# Patient Record
Sex: MALE | Race: OTHER | Employment: OTHER | URBAN - METROPOLITAN AREA
[De-identification: names, ages, dates, MRNs, and addresses within clinical notes are randomized per-mention and may not be internally consistent; named-entity substitution may affect disease eponyms.]

---

## 2017-02-23 PROBLEM — R06.02 SHORTNESS OF BREATH: Status: ACTIVE | Noted: 2017-01-12

## 2017-04-18 ENCOUNTER — PATIENT OUTREACH (OUTPATIENT)
Dept: CASE MANAGEMENT | Age: 77
End: 2017-04-18

## 2017-04-18 NOTE — PROGRESS NOTES
[facility] initial call outreach to Mr. Janessa Brady, no answer, left a voice message at [phone] with my contact number. I will make another attempt next week to engage patient in our services.

## 2017-04-26 ENCOUNTER — PATIENT OUTREACH (OUTPATIENT)
Dept: CASE MANAGEMENT | Age: 77
End: 2017-04-26

## 2017-04-26 NOTE — Clinical Note
Seems this one may have been a recent d/c from [facility]. .I have been unable to reach this patient by phone so far, but looks like the PCP visit was cancelled.

## 2017-04-26 NOTE — PROGRESS NOTES
CCM/MSSP outreach to Mr. Chan Level, home contact there was no answer, left a message and tried the emergency contact as well, but was not able to leave a message there. I will try again next week. It appears the patient may have recently been discharged from [facility] according to the last chart note.

## 2017-05-04 ENCOUNTER — PATIENT OUTREACH (OUTPATIENT)
Dept: CASE MANAGEMENT | Age: 77
End: 2017-05-04

## 2017-09-13 PROBLEM — K59.01 SLOW TRANSIT CONSTIPATION: Status: ACTIVE | Noted: 2017-04-12

## 2017-09-13 PROBLEM — F41.8 DEPRESSION WITH ANXIETY: Status: ACTIVE | Noted: 2017-09-13

## 2017-09-19 ENCOUNTER — PATIENT OUTREACH (OUTPATIENT)
Dept: CASE MANAGEMENT | Age: 77
End: 2017-09-19

## 2017-09-19 NOTE — PROGRESS NOTES
CCM referral from PCP to contact patient and see where we can assist. I did call Mr. Denzel Barriga and he was very difficult to understand over the phone, but I was able to verify his list of medications. He does speak [place] but he speaks very fast and mumbled. I asked him to provide me with the number and name of his [facility]  or nurse and he gave me was [phone] or [phone] and neither of these numbers were valid. I will attempt to reach out to a family member to help me. I did review the chart and medications, seems patient does have nursing coming in the home and he has multiple chronic conditions that is managed. I will evaluate and see if I can schedule an in person home visit to assess needs. This note will not be viewable in [address]

## 2017-09-26 ENCOUNTER — PATIENT OUTREACH (OUTPATIENT)
Dept: CASE MANAGEMENT | Age: 77
End: 2017-09-26

## 2017-09-26 NOTE — PROGRESS NOTES
[facility] call to patient via  services (#[phone]) and spoke with both wife and patient about current home issues with medications and compliance. Even with the  it was difficult to communicate the purpose of my call. I did schedule an in person visit to the home with a  for 10/5/17 @ 1:00pm. I have submitted a request with our  services for this date/time. This note will not be viewable in [address]

## 2017-10-05 ENCOUNTER — PATIENT OUTREACH (OUTPATIENT)
Dept: CASE MANAGEMENT | Age: 77
End: 2017-10-05

## 2017-10-05 NOTE — PROGRESS NOTES
[facility] Home Assessment completed today with patient,  from [facility] present, wife, and nurse with [facility] all present. There is a lot of family conflict as well as conflict with his current caregivers that come into his home everyday for 12 hours per day. He has one nurse that comes during this time, sometimes a respiratory therapist weekly or when needed, a , and has his wife at home all day. His grandson works but comes over at [employer] every morning before he goes to work at [employer] to assist with toileting duties and trach care. Ilene Linda is 22years old and has a lot of responsibilities so he is limited in time there. Patient has only his wife from 6PM-4AM by herself but reports that patient does usually sleep through the night until at least 4AM when their grandson arrives. Patient is very demanding in what he expects as far as his day to day care. When I asked him what issues he had with his current caregivers that come to aid him, the only complaints is that he does not want to always take all of his medicines, he does not want to participate in CPT although he says he has been agreeable to this since his last visit with PCP, and he wants to be helped into his electric wheelchair more through the day. He has a yfn lift but there must be two persons to aid him with this and there is only one nurse there most of the time. I did complete a medication reconciliation with him and his [address] [place] [address] staff member and wife. Patient requests the following OTC to be listed as PRN on his list by PCP. (Zinc 50 mg daily prn; ASA 81mg daily prn; and Vitamin C 500mg daily prn) - so that the [address] [place] [address] RN can administer these to him as well as the other ones on his list.  Patient reports he has good bowel movements daily, sometimes two a day, RN reports these have been runny, no formed school. Patient has a good appetite, eats what his wife cooks him which is low salt diet mostly.  Patient wants the RN to \"skip trach care steps\" and to perform trach care to his specifications which are not part of their procedures. I explained through the  today that there are polices and procedures that we as healthcare workers must adhere to by law and he has to accept this with any company that comes in. RN with [address] reports that patient has a lot of behavioral issues through the day, everyday, sometimes screaming and cursing 8 hours out of the 12 she is there. His wife says he repeatedly calls her name for small things like to dial a phone number for him or bring food and she says he humiliates her and controls all bank accounts that they use to buy food and household items. The RN states that she has been cursed out multiple times by the patient, that he has pulled his trach out and called 911 because he refused to let her put the trach back in place. Patient is on a vent and requires all ADL's to be done by nursing care. CLTC also helps with housework, laundry, errands to pharmacy and grocery store, and all ADL's. Patient previously was with [facility] for [facility], but due to low staffing they were unable to continue with him. Patient is in a clean environment and appears well taken care of. Patient was asked if he preferred to go to a skilled nursing facility where he could be offered much more attention and care, but he refuses. Patient states he wants the RN and family to be available to him at all times for anything he needs. The patient has very unreasonable expectations of the care he feels he should be getting. I explained to him that he really has no other home care options at this point unless he wanted to privately pay out of pocket for another agency. I did obtain the name of his  and her contact number to let her know that I did make a home visit today. Patient's wife is very tearful today and states her health has been very poor and she has chronic low back pain and appears depressed.  I encouraged her to make a daily scheduled and provide this to Mr. Zion Taylor of what his day will consist of to include her some time away from the home to be able to take care of her own health needs. She agrees things are very tense in the home right now. Patient really could probably benefit from psychiatry, psychology, or neurology as caregivers report he has lots of memory issues and sometimes rambles on about things that do not make sense. The patient has a big issue with compliance so this is a very difficult situation to manage. PLAN:   1. I will review the medication concerns with Dr. Simon Arredondo that the patient and I discussed. 2. I will continue to see what community services we could recommend for the wife and grandson to help with coping. 3. I will plan to call [facility]  and discuss what can happen so the patient is able to get into his wheelchair more often during the day. 4. There is definitely a communication barrier. The patient does speak English but I cannot understand that so a  is definitely needed between him and caregivers and also for wife's benefit. This note will not be viewable in [address]

## 2017-10-05 NOTE — ACP (ADVANCE CARE PLANNING)
I did present the patient with a copy of Advance Directives in [address], but he declined and states he already has those in place.

## 2017-10-06 ENCOUNTER — PATIENT OUTREACH (OUTPATIENT)
Dept: CASE MANAGEMENT | Age: 77
End: 2017-10-06

## 2017-10-06 NOTE — PROGRESS NOTES
Call to [address] [phone] and explained my role and discussed what my role is and what I reviewed with the patient and family at the home yesterday. She stated she had a similar meeting with the patient and family recently about what they could offer. She will continue to have her staff work with Mr. Donna Altamirano on his needs and make an effort to help the family learn how to cope with his behavior and health concerns. This note will not be viewable in [address]

## 2017-10-27 ENCOUNTER — PATIENT OUTREACH (OUTPATIENT)
Dept: CASE MANAGEMENT | Age: 77
End: 2017-10-27

## 2018-01-01 ENCOUNTER — HOSPITAL ENCOUNTER (EMERGENCY)
Age: 78
Discharge: HOME | End: 2018-05-30
Attending: EMERGENCY MEDICINE
Payer: MEDICARE

## 2018-01-01 ENCOUNTER — APPOINTMENT (OUTPATIENT)
Dept: GENERAL RADIOLOGY | Age: 78
End: 2018-01-01
Attending: EMERGENCY MEDICINE
Payer: MEDICARE

## 2018-01-01 VITALS
SYSTOLIC BLOOD PRESSURE: 125 MMHG | BODY MASS INDEX: 23.7 KG/M2 | HEART RATE: 96 BPM | RESPIRATION RATE: 22 BRPM | WEIGHT: 160 LBS | OXYGEN SATURATION: 100 % | DIASTOLIC BLOOD PRESSURE: 72 MMHG | TEMPERATURE: 99.5 F | HEIGHT: 69 IN

## 2018-01-01 DIAGNOSIS — Z93.1 G TUBE FEEDINGS (HCC): Primary | ICD-10-CM

## 2018-01-01 PROCEDURE — 99284 EMERGENCY DEPT VISIT MOD MDM: CPT | Performed by: EMERGENCY MEDICINE

## 2018-01-01 PROCEDURE — 77030005103 HC CATH GASTMY BLN HALY -B

## 2018-01-01 PROCEDURE — 75810000109 HC GASTRO TUBE CHANGE: Performed by: EMERGENCY MEDICINE

## 2018-01-01 PROCEDURE — 74011636320 HC RX REV CODE- 636/320: Performed by: EMERGENCY MEDICINE

## 2018-01-01 PROCEDURE — 74018 RADEX ABDOMEN 1 VIEW: CPT

## 2018-01-01 RX ADMIN — DIATRIZOATE MEGLUMINE AND DIATRIZOATE SODIUM 30 ML: 660; 100 LIQUID ORAL; RECTAL at 12:31

## 2018-04-27 PROBLEM — R06.02 SHORTNESS OF BREATH: Status: RESOLVED | Noted: 2017-01-12 | Resolved: 2018-01-01

## 2018-05-30 NOTE — ED PROVIDER NOTES
HPI Comments: 75-year-old male brought to the emergency department by [facility] for feeding tube placement    Patient has ALS. Had feeding tube placed several years ago at [facility] we believe    Saw his primary care doctor last week was referred to Dr. Venda Simmonds from surgery. Dr. Venda Simmonds could not see the patient in the office because he is on a home ventilator. Advised him to go to interventional radiology. They were unable to schedule that so the primary care physician just advised him to come to the emergency department    Patient denies any complaints. Initially did not want to be seen because he thought he is being dropped off at a nursing home reassured him    The PEG tube site is slightly erythematous. There is no surrounding cellulitis. It certainly doesn't look infected. It has been in place for several years without being changed so we'll go ahead and change it out. Patient is a 66 y.o. male presenting with abdominal pain. Abdominal Pain    Pertinent negatives include no fever.         Past Medical History:   Diagnosis Date    Acute respiratory failure ([address]     Amyotrophic lateral sclerosis (HCC)     Anxiety disorder in conditions classified elsewhere     Anxiety state, unspecified     Arthritis     OA- lower back, knees    Benign localized hyperplasia of prostate without urinary obstruction and other lower urinary tract symptoms (LUTS)     Constipation     Decubitus ulcer of buttock     Depressive disorder, not elsewhere classified     Dysphagia     Foot drop     left foot- wears brace    GERD (gastroesophageal reflux disease)     controlled- omeprazole    Gout, unspecified     Hypertension     controlled with meds    Obesity, unspecified     Other and unspecified hyperlipidemia     Psychiatric disorder     anxiety- no meds    Spinal stenosis, lumbar region, without neurogenic claudication     Ventilator dependence [facility])        Past Surgical History: Procedure Laterality Date    HX BACK SURGERY  9/26/2011         Family History:   Problem Relation Age of Onset    Heart Disease Mother      CHF    Cancer Mother     Breast Cancer Mother     Cancer Father      colon    Diabetes Father        Social History     Social History    Marital status:      Spouse name: N/A    Number of children: N/A    Years of education: N/A     Occupational History    Not on file. Social History Main Topics    Smoking status: Never Smoker    Smokeless tobacco: Never Used    Alcohol use 1.0 oz/week     2 Cans of beer per week    Drug use: No    Sexual activity: No     Other Topics Concern    Not on file     Social History Narrative         ALLERGIES: Clindamycin; Erythromycin; and Pollen extracts    Review of Systems   Constitutional: Negative for chills and fever. Respiratory: Negative for shortness of breath. Gastrointestinal: Negative for abdominal pain. There were no vitals filed for this visit. Physical Exam   Constitutional: He appears well-developed and well-nourished. No distress. HENT:   Head: Normocephalic. Eyes: Pupils are equal, round, and reactive to light. Cardiovascular: Regular rhythm. Pulmonary/Chest: Breath sounds normal.   Abdominal: He exhibits distension. There is no tenderness. There is no rebound. Skin: Skin is warm and dry. Nursing note and vitals reviewed. MDM  Number of Diagnoses or Management Options  Diagnosis management comments: 75-year-old male with ALS who has a PEG tube and a trach. All PEG removed and thrown away no difficulty removing it    Placed an 18-gauge feeding tube no resistance. Able to suction stomach contents. Due to the degree of some stomach distention placed on intermittent suction to get some air out. We'll get a Gastrografin KUB.   Carmen Lazar MD; 5/30/2018 @11:54 AM===========================================         Amount and/or Complexity of Data Reviewed  Tests in the radiology section of CPT®: ordered          ED Course       Procedures

## 2018-05-30 NOTE — ED TRIAGE NOTES
Patient live in private home. Patient came to ED today to get PEG tube assessed. Patients paid caregiver with patient at this time. Patient paid caregiver states patient has had his PEG tube since 2011 however, PCG states PEG tube has been infected and draining x1 month. /74, resp 16, ,O2 sat 100% on patient portable ventilator. BGL 74.

## 2018-05-30 NOTE — DISCHARGE INSTRUCTIONS
Learning About Living With a Feeding Tube  What is tube feeding? Your body needs nutrition to stay strong and help you live a healthy life. If you're unable to eat, or if you have an illness that makes it hard to swallow food, you may need a feeding tube. The tube is placed in the stomach and is used to give food, liquids, and medicines. Depending on why you need a feeding tube, you may have it for several weeks or months or longer. When you first get a feeding tube, your biggest challenge may be your new relationship with food. For many people, eating and savoring food is one of the most pleasing parts of daily life. You may grieve the loss of the daily habit of eating and the social aspects of sharing food with others. If you've struggled to get enough nutrition-if it's been hard to eat or swallow-having a feeding tube can help you regain your health and strength. And understanding how a feeding tube works is a first step toward dealing with changes that come with having the tube. It can also help you avoid common problems that can occur. What can you expect when you have a feeding tube? After surgery to insert a feeding tube, you'll have a 6- to 12-inch tube coming out of your belly. The tube is about the same width as a pen. There are different ways the tube can be used for feeding. Your doctor will help you decide which is best for you and how often feedings should occur. · A feeding syringe. This is most common. A syringe is connected to the tube. A nutritional mixture (formula) is put into the syringe and flows into the tube and your stomach. This is called bolus feeding. · A gravity bag. Formula is placed into a special bag that is hung on a hook or a pole. The height and weight of the bag make the food flow down the tube and into your stomach. · A bag and pump. A pump is used to push formula from a bag through the tube. This is also called continuous feeding.   How do you use a feeding tube?  It's important that the food you use for tube feeding have the right blend of nutrients for you. And the food needs to be the correct thickness so the tube doesn't clog. For most people, a milk shake-type of formula works best for tube feeding. Your doctor or dietitian will help you find the right formula to use. Each time you use the tube for feeding:  · Make sure that the tube-feeding formula is at room temperature. · Wash your hands before you handle the tube and formula. Wash the top of the can of formula before you open it. · Follow your doctor's instructions for how much formula to use for each feeding. ¨ If using a feeding syringe: Connect the syringe to the tube, and put the formula into the syringe. Hold the syringe up high so the formula flows into the tube. Use the plunger on the syringe to gently push any remaining formula into the tube. ¨ If using a gravity bag: Connect the bag to the tube, and add the formula to the bag. Hang the bag on a hook or pole about 18 inches above the stomach. Depending on the type of formula, the food may take a few hours to flow through the tube. Ask your doctor what you can expect and how long it should take. ¨ If using a bag and pump, follow the instructions that come with the pump. · Sit up or keep your head up during the feeding and for 30 minutes after. · If you feel sick to your stomach or have stomach cramps during the feeding, slow the rate that the formula comes through the tube. Then slowly increase the rate as you can manage it. · Keep the formula in the refrigerator after you open it. Don't let the formula sit at room temperature for more than 8 hours. Throw away any open cans of food after 24 hours, even if they have been refrigerated. · Talk with your doctor about changing your feedings or medicines if you are having problems with diarrhea, constipation, or vomiting. How do you care for a feeding tube? · Keep it clean.  That's the most important thing you need to know about caring for your tube. Flush the tube with warm water before and after feedings or giving medicines. You can use a syringe to push water through the tube. Clean the end (opening) of the tube every day with an antiseptic wipe. · Always wash your hands before touching the tube. · Tape the tube to your body so the end is facing up. Look for medical tape in your local drugstore. It may irritate your skin less than other types of tape. Change the position of the tape every few days. · Clamp the tube when you're not using it. Put the clamp close to your body so that food and liquids don't run down the tube. · Keep the skin around the tube clean and dry. How do you avoid problems with a feeding tube? · Blocked tube. A blocked tube can happen when the tube isn't flushed or when formula or medicines are too thick. ¨ Prevent blockage by flushing the tube with warm water before and after using the tube. ¨ If the tube is blocked, try to clear it by flushing the tube. Call your doctor if the tube won't clear. ¨ Don't use a wire or anything else to try to unclog a tube. A wire can poke a hole in the tube. · Tube falls out. Don't try to put the tube back in by yourself. Call your doctor right away. The tube needs to be replaced before the opening in your belly closes, which can happen within hours. · Leaking tube. A tube that leaks may be blocked, or it may not fit right. After checking the tube and flushing it to make sure that the tube isn't blocked, call your doctor. Where can you learn more? Go to http://annabelle-abel.info/. Enter F416 in the search box to learn more about \"Learning About Living With a Feeding Tube. \"  Current as of: May 12, 2017  Content Version: 11.4  © 6262-3618 [employer]. Care instructions adapted under license by [employer] (which disclaims liability or warranty for this information).  If you have questions about a medical condition or this instruction, always ask your healthcare professional. [place] [address] any warranty or liability for your use of this information.

## 2018-05-30 NOTE — ED NOTES
I have reviewed discharge instructions with the patient and PCG. The patient and PCG verbalized understanding. Patient left ED via Discharge Method: stretcher to Home with EMS. Opportunity for questions and clarification provided. Patient given 0 scripts. No e-sign. To continue your aftercare when you leave the hospital, you may receive an automated call from our care team to check in on how you are doing. This is a free service and part of our promise to provide the best care and service to meet your aftercare needs.  If you have questions, or wish to unsubscribe from this service please call [phone]. Thank you for Choosing our [facility] Emergency Department.

## 2020-03-31 NOTE — PROGRESS NOTES
CCM follow up call to Mr. Katt Dodson, no answer, left a message for him with my contact number. I completed a chart review today. Will continue to help family with outside resources which may help them cope with how to care for Mr. Katt Dodson. CLTC still in the home currently everyday. This note will not be viewable in [address] Detail Level: Zone